# Patient Record
Sex: MALE | Race: WHITE | NOT HISPANIC OR LATINO | Employment: UNEMPLOYED | URBAN - METROPOLITAN AREA
[De-identification: names, ages, dates, MRNs, and addresses within clinical notes are randomized per-mention and may not be internally consistent; named-entity substitution may affect disease eponyms.]

---

## 2019-04-28 PROBLEM — K56.609 SBO (SMALL BOWEL OBSTRUCTION): Status: ACTIVE | Noted: 2019-04-28

## 2019-05-29 ENCOUNTER — HOSPITAL ENCOUNTER (INPATIENT)
Facility: HOSPITAL | Age: 44
LOS: 6 days | Discharge: HOME | DRG: 882 | End: 2019-06-04
Attending: PSYCHIATRY & NEUROLOGY | Admitting: PSYCHIATRY & NEUROLOGY
Payer: MEDICAID

## 2019-05-29 DIAGNOSIS — F15.20 METHAMPHETAMINE USE DISORDER, SEVERE: ICD-10-CM

## 2019-05-29 DIAGNOSIS — M25.571 ACUTE RIGHT ANKLE PAIN: ICD-10-CM

## 2019-05-29 DIAGNOSIS — R45.851 DEPRESSION WITH SUICIDAL IDEATION: ICD-10-CM

## 2019-05-29 DIAGNOSIS — F33.2 SEVERE EPISODE OF RECURRENT MAJOR DEPRESSIVE DISORDER, WITHOUT PSYCHOTIC FEATURES: Primary | ICD-10-CM

## 2019-05-29 DIAGNOSIS — F32.A DEPRESSION WITH SUICIDAL IDEATION: ICD-10-CM

## 2019-05-29 DIAGNOSIS — K50.10 CROHN'S DISEASE OF COLON WITHOUT COMPLICATION: ICD-10-CM

## 2019-05-29 DIAGNOSIS — F43.23 ADJUSTMENT DISORDER WITH MIXED ANXIETY AND DEPRESSED MOOD: ICD-10-CM

## 2019-05-29 PROCEDURE — 80061 LIPID PANEL: CPT

## 2019-05-29 PROCEDURE — 83036 HEMOGLOBIN GLYCOSYLATED A1C: CPT

## 2019-05-29 PROCEDURE — 82306 VITAMIN D 25 HYDROXY: CPT

## 2019-05-29 PROCEDURE — 86701 HIV-1ANTIBODY: CPT

## 2019-05-29 PROCEDURE — 82746 ASSAY OF FOLIC ACID SERUM: CPT

## 2019-05-29 PROCEDURE — 82607 VITAMIN B-12: CPT

## 2019-05-29 PROCEDURE — 80074 ACUTE HEPATITIS PANEL: CPT

## 2019-05-29 PROCEDURE — 86592 SYPHILIS TEST NON-TREP QUAL: CPT

## 2019-05-29 PROCEDURE — 11400000 HC PSYCH PRIVATE ROOM

## 2019-05-29 RX ORDER — OLANZAPINE 10 MG/1
10 TABLET ORAL EVERY 4 HOURS PRN
Status: DISCONTINUED | OUTPATIENT
Start: 2019-05-29 | End: 2019-06-04 | Stop reason: HOSPADM

## 2019-05-29 RX ORDER — FOLIC ACID 1 MG/1
1 TABLET ORAL DAILY
Status: DISCONTINUED | OUTPATIENT
Start: 2019-05-30 | End: 2019-06-04 | Stop reason: HOSPADM

## 2019-05-29 RX ORDER — LOPERAMIDE HYDROCHLORIDE 2 MG/1
2 CAPSULE ORAL
Status: DISCONTINUED | OUTPATIENT
Start: 2019-05-29 | End: 2019-06-04 | Stop reason: HOSPADM

## 2019-05-29 RX ORDER — ACETAMINOPHEN 325 MG/1
650 TABLET ORAL EVERY 6 HOURS PRN
Status: DISCONTINUED | OUTPATIENT
Start: 2019-05-29 | End: 2019-06-04 | Stop reason: HOSPADM

## 2019-05-29 RX ORDER — HYDROXYZINE PAMOATE 50 MG/1
50 CAPSULE ORAL NIGHTLY PRN
Status: DISCONTINUED | OUTPATIENT
Start: 2019-05-29 | End: 2019-06-04 | Stop reason: HOSPADM

## 2019-05-29 RX ORDER — IBUPROFEN 200 MG
1 TABLET ORAL DAILY PRN
Status: DISCONTINUED | OUTPATIENT
Start: 2019-05-29 | End: 2019-05-30

## 2019-05-29 RX ORDER — DOCUSATE SODIUM 100 MG/1
100 CAPSULE, LIQUID FILLED ORAL DAILY PRN
Status: DISCONTINUED | OUTPATIENT
Start: 2019-05-29 | End: 2019-06-04 | Stop reason: HOSPADM

## 2019-05-29 RX ORDER — OLANZAPINE 10 MG/2ML
10 INJECTION, POWDER, FOR SOLUTION INTRAMUSCULAR EVERY 4 HOURS PRN
Status: DISCONTINUED | OUTPATIENT
Start: 2019-05-29 | End: 2019-06-04 | Stop reason: HOSPADM

## 2019-05-29 RX ORDER — MAG HYDROX/ALUMINUM HYD/SIMETH 200-200-20
30 SUSPENSION, ORAL (FINAL DOSE FORM) ORAL EVERY 6 HOURS PRN
Status: DISCONTINUED | OUTPATIENT
Start: 2019-05-29 | End: 2019-06-04 | Stop reason: HOSPADM

## 2019-05-30 PROBLEM — F43.23 ADJUSTMENT DISORDER WITH MIXED ANXIETY AND DEPRESSED MOOD: Status: ACTIVE | Noted: 2019-05-30

## 2019-05-30 PROBLEM — K50.10 CROHN'S DISEASE OF COLON WITHOUT COMPLICATION: Status: ACTIVE | Noted: 2019-05-30

## 2019-05-30 PROBLEM — M25.571 ACUTE RIGHT ANKLE PAIN: Status: ACTIVE | Noted: 2019-05-30

## 2019-05-30 PROBLEM — F15.20 METHAMPHETAMINE USE DISORDER, SEVERE: Status: ACTIVE | Noted: 2019-05-30

## 2019-05-30 LAB
25(OH)D3+25(OH)D2 SERPL-MCNC: 23 NG/ML (ref 30–96)
CHOLEST SERPL-MCNC: 145 MG/DL (ref 120–199)
CHOLEST/HDLC SERPL: 3.6 {RATIO} (ref 2–5)
ESTIMATED AVG GLUCOSE: 100 MG/DL (ref 68–131)
FOLATE SERPL-MCNC: 11.9 NG/ML (ref 4–24)
HAV IGM SERPL QL IA: NEGATIVE
HBA1C MFR BLD HPLC: 5.1 % (ref 4–5.6)
HBV CORE IGM SERPL QL IA: NEGATIVE
HBV SURFACE AG SERPL QL IA: NEGATIVE
HCV AB SERPL QL IA: NEGATIVE
HDLC SERPL-MCNC: 40 MG/DL (ref 40–75)
HDLC SERPL: 27.6 % (ref 20–50)
HIV1+2 IGG SERPL QL IA.RAPID: NEGATIVE
LDLC SERPL CALC-MCNC: 79.8 MG/DL (ref 63–159)
NONHDLC SERPL-MCNC: 105 MG/DL
TRIGL SERPL-MCNC: 126 MG/DL (ref 30–150)
VIT B12 SERPL-MCNC: <146 PG/ML (ref 210–950)

## 2019-05-30 PROCEDURE — 99223 PR INITIAL HOSPITAL CARE,LEVL III: ICD-10-PCS | Mod: ,,, | Performed by: PSYCHIATRY & NEUROLOGY

## 2019-05-30 PROCEDURE — 99221 PR INITIAL HOSPITAL CARE,LEVL I: ICD-10-PCS | Mod: ,,, | Performed by: NURSE PRACTITIONER

## 2019-05-30 PROCEDURE — 99223 1ST HOSP IP/OBS HIGH 75: CPT | Mod: ,,, | Performed by: PSYCHIATRY & NEUROLOGY

## 2019-05-30 PROCEDURE — 36415 COLL VENOUS BLD VENIPUNCTURE: CPT

## 2019-05-30 PROCEDURE — 11400000 HC PSYCH PRIVATE ROOM

## 2019-05-30 PROCEDURE — 90833 PR PSYCHOTHERAPY W/PATIENT W/E&M, 30 MIN (ADD ON): ICD-10-PCS | Mod: ,,, | Performed by: PSYCHIATRY & NEUROLOGY

## 2019-05-30 PROCEDURE — S4991 NICOTINE PATCH NONLEGEND: HCPCS | Performed by: PSYCHIATRY & NEUROLOGY

## 2019-05-30 PROCEDURE — 25000003 PHARM REV CODE 250: Performed by: PSYCHIATRY & NEUROLOGY

## 2019-05-30 PROCEDURE — 97802 MEDICAL NUTRITION INDIV IN: CPT

## 2019-05-30 PROCEDURE — 99221 1ST HOSP IP/OBS SF/LOW 40: CPT | Mod: ,,, | Performed by: NURSE PRACTITIONER

## 2019-05-30 PROCEDURE — 90833 PSYTX W PT W E/M 30 MIN: CPT | Mod: ,,, | Performed by: PSYCHIATRY & NEUROLOGY

## 2019-05-30 RX ORDER — IBUPROFEN 200 MG
1 TABLET ORAL DAILY
Status: DISCONTINUED | OUTPATIENT
Start: 2019-05-30 | End: 2019-06-03

## 2019-05-30 RX ORDER — BUPROPION HYDROCHLORIDE 150 MG/1
150 TABLET ORAL DAILY
Status: DISCONTINUED | OUTPATIENT
Start: 2019-05-30 | End: 2019-06-02

## 2019-05-30 RX ADMIN — BUPROPION HYDROCHLORIDE 150 MG: 150 TABLET, EXTENDED RELEASE ORAL at 09:05

## 2019-05-30 RX ADMIN — THERA TABS 1 TABLET: TAB at 08:05

## 2019-05-30 RX ADMIN — FOLIC ACID 1 MG: 1 TABLET ORAL at 08:05

## 2019-05-30 RX ADMIN — NICOTINE 1 PATCH: 14 PATCH, EXTENDED RELEASE TRANSDERMAL at 09:05

## 2019-05-30 NOTE — PSYCH
"Group Note      Behavior    Patient attended group psychotherapy today. Patient exhibited very  depressive mood with guarded affect.     Intervention     CBT-based group psychotherapy with focused attention on the identification of internal coping strategies including the importance of re-framing identified anxieties to formulate positive re-frames as well as to identifying and exploring one thing that is most important to patients and worth living for; patient were asked to discuss their internal coping strategies.       Response    Patient stated initially that "society shit on me and I am not happy about it." Patient spoke about the source of his homelessness and his inability to find lasting employment. Patient participated in an initial role playing exercise. He agreed after the exercise that having a better attitude may have its advantages; it may open a new place in his heart, a place where he may be able to welcome new friends and open fully celebrate his humanity.         Plan    To continue to encourage the patient to attend group psychotherapy with focused attention on continued work in the area of internal coping strategies.                       "

## 2019-05-30 NOTE — PROGRESS NOTES
"   05/30/19 1040   [facility] Group Therapy   Group Name Leisure Skills Training   Specific Interventions Cognitive Stimulation Training   Participation Level Appropriate;Attentive   Participation Quality Cooperative   Insight/Motivation Applies New Skills;Good   Affect/Mood Display Depressed   Cognition Alert   Psychomotor WNL   Patient shows interest in activity, quiet unless approached, shows initial ability to comprehend directions. Patient states skills he used most for this activity was "concentration and observation."   "

## 2019-05-30 NOTE — PSYCH
"Patient remarked that he needs immediate shelter for himself and his wife and her daughter. Patient stated that any other conversation not "geared" towards this goal is not worth his time.     Plan    Patient engagement will continue to be initiated at another time; was not interested in continuing assessment until the focus is only on addressing his need for housing.   "
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
## 2019-05-30 NOTE — CONSULTS
[facility] Hospital Medicine  Consult Note    Patient Name: Chon Bess  MRN: [id number]  Admission Date: 5/29/2019  Hospital Length of Stay: 1 days  Attending Physician: César Scott MD   Primary Care Provider: Primary Doctor No           Patient information was obtained from patient and ER records.     Inpatient consult to [facility] for History and Physical  Consult performed by: Jeniffer Pantoja NP  Consult ordered by: César Scott MD        Subjective:     Principal Problem: <principal problem not specified>    Chief Complaint: No chief complaint on file.       HPI: 45yo male patient admitted via ER to [facility] with c/o suicidal thoughts. He has no medical history and does not take meds at home. Medicine consulted for H/P. VSS/afebrile. Labs reviewed + UDS    He reports that he has crohns with intermittent N/D but none today. Does not see anyone for this now. Also c/o ankle pain. Has hx of fracture and had screws placed,. Chronic pain and sees no one for this either. Has tylenol PRN     Past Medical History:   Diagnosis Date    Crohn's disease     Depression     Substance abuse        Past Surgical History:   Procedure Laterality Date    COLON SURGERY         Review of patient's allergies indicates:  No Known Allergies    Current Facility-Administered Medications on File Prior to Encounter   Medication    [DISCONTINUED] diphenhydrAMINE injection 50 mg    [DISCONTINUED] haloperidol lactate injection 5 mg    [DISCONTINUED] LORazepam tablet 1 mg     No current outpatient medications on file prior to encounter.     Family History     None        Tobacco Use    Smoking status: Current Every Day Smoker     Packs/day: 1.50     Types: Cigarettes    Smokeless tobacco: Never Used   Substance and Sexual Activity    Alcohol use: Never     Frequency: Never     Comment: occ    Drug use: Yes     Types: Amphetamines    Sexual activity: Yes     Partners: Female     Birth  control/protection: Coitus interruptus     Review of Systems   Constitutional: Negative for chills and fever.   HENT: Negative for congestion, postnasal drip and sore throat.    Eyes: Negative for photophobia.   Respiratory: Negative for chest tightness and shortness of breath.    Cardiovascular: Negative for chest pain.   Gastrointestinal: Negative for abdominal distention, abdominal pain, blood in stool and vomiting.   Genitourinary: Negative for dysuria, flank pain and hematuria.   Musculoskeletal: Negative for back pain.   Skin: Negative for pallor.   Neurological: Negative for dizziness, seizures, facial asymmetry, speech difficulty and numbness.   Hematological: Does not bruise/bleed easily.   Psychiatric/Behavioral: Negative for agitation and suicidal ideas. The patient is not nervous/anxious.      Objective:     Vital Signs (Most Recent):  Temp: 96.5 °F (35.8 °C) (05/30/19 0800)  Pulse: 67 (05/30/19 0800)  Resp: 18 (05/30/19 0800)  BP: 123/83 (05/30/19 0800) Vital Signs (24h Range):  Temp:  [96.5 °F (35.8 °C)-98.1 °F (36.7 °C)] 96.5 °F (35.8 °C)  Pulse:  [] 67  Resp:  [16-18] 18  SpO2:  [97 %] 97 %  BP: (117-130)/(68-97) 123/83     Weight: 72.7 kg (160 lb 4.4 oz)  Body mass index is 22.35 kg/m².    Physical Exam   Constitutional: He is oriented to person, place, and time. He appears well-developed and well-nourished.   HENT:   Head: Normocephalic and atraumatic.   Neck: Normal range of motion. Neck supple. No thyromegaly present.   Cardiovascular: Normal rate, regular rhythm, normal heart sounds and intact distal pulses.   No murmur heard.  Pulmonary/Chest: Effort normal and breath sounds normal. No respiratory distress. He has no wheezes. He has no rales.   Abdominal: Soft. Bowel sounds are normal. He exhibits no distension. There is no tenderness.   Musculoskeletal: Normal range of motion. He exhibits no edema or deformity.   Neurological: He is alert and oriented to person, place, and time.   Neuro:  Cranial nerves:  CN II Visual fields full to confrontation.   CN III, IV, VI Pupils are equal, round, and reactive to light.  CN III: no palsy  Nystagmus: none   Diplopia: none  Ophthalmoparesis: none  CN V Facial sensation intact.   CN VII Facial expression full, symmetric.   CN VIII normal.   CN IX normal.   CN X normal.   CN XI normal.   CN XII normal.     Skin: Skin is warm and dry.   Psychiatric: He has a normal mood and affect. His behavior is normal. Thought content normal.   Nursing note and vitals reviewed.      Significant Labs:   U/A  Results for orders placed or performed during the hospital encounter of 09/17/18   Urinalysis Clean Catch   Result Value Ref Range    Specimen UA Urine, Clean Catch     Color, UA Yellow Yellow, Straw, Ghislaine    Appearance, UA Clear Clear    pH, UA 6.0 5.0 - 8.0    Specific Gravity, UA 1.015 1.005 - 1.030    Protein, UA Negative Negative    Glucose, UA Negative Negative    Ketones, UA Negative Negative    Bilirubin (UA) Negative Negative    Occult Blood UA Negative Negative    Nitrite, UA Negative Negative    Urobilinogen, UA Negative <2.0 EU/dL    Leukocytes, UA Negative Negative     UDS  Results for orders placed or performed during the hospital encounter of 05/29/19   Drug screen panel, emergency   Result Value Ref Range    Benzodiazepines Negative     Methadone metabolites Negative     Cocaine (Metab.) Negative     Opiate Scrn, Ur Negative     Barbiturate Screen, Ur Negative     Amphetamine Screen, Ur Presumptive Positive     THC Negative     Phencyclidine Negative     Creatinine, Random Ur 144.7 23.0 - 375.0 mg/dL    Toxicology Information SEE COMMENT      CBC  Results for orders placed or performed during the hospital encounter of 05/29/19   CBC auto differential   Result Value Ref Range    WBC 10.10 3.90 - 12.70 K/uL    RBC 4.48 (L) 4.60 - 6.20 M/uL    Hemoglobin 14.2 14.0 - 18.0 g/dL    Hematocrit 42.6 40.0 - 54.0 %    Mean Corpuscular Volume 95 82 - 98 fL    Mean  Corpuscular Hemoglobin 31.7 (H) 27.0 - 31.0 pg    Mean Corpuscular Hemoglobin Conc 33.3 32.0 - 36.0 g/dL    RDW 12.4 11.5 - 14.5 %    Platelets 251 150 - 350 K/uL    MPV 9.2 9.2 - 12.9 fL    Gran # (ANC) 7.0 1.8 - 7.7 K/uL    Lymph # 2.2 1.0 - 4.8 K/uL    Mono # 0.6 0.3 - 1.0 K/uL    Eos # 0.2 0.0 - 0.5 K/uL    Baso # 0.04 0.00 - 0.20 K/uL    Gran% 69.5 38.0 - 73.0 %    Lymph% 21.9 18.0 - 48.0 %    Mono% 6.3 4.0 - 15.0 %    Eosinophil% 1.9 0.0 - 8.0 %    Basophil% 0.4 0.0 - 1.9 %    Differential Method Automated      CMP  Results for orders placed or performed during the hospital encounter of 05/29/19   Comprehensive metabolic panel   Result Value Ref Range    Sodium 138 136 - 145 mmol/L    Potassium 3.6 3.5 - 5.1 mmol/L    Chloride 107 95 - 110 mmol/L    CO2 25 23 - 29 mmol/L    Glucose 94 70 - 110 mg/dL    BUN, Bld 6 6 - 20 mg/dL    Creatinine 0.8 0.5 - 1.4 mg/dL    Calcium 8.6 (L) 8.7 - 10.5 mg/dL    Total Protein 6.0 6.0 - 8.4 g/dL    Albumin 3.3 (L) 3.5 - 5.2 g/dL    Total Bilirubin 0.2 0.1 - 1.0 mg/dL    Alkaline Phosphatase 57 55 - 135 U/L    AST 12 10 - 40 U/L    ALT 11 10 - 44 U/L    Anion Gap 6 (L) 8 - 16 mmol/L    eGFR if African American >60 >60 mL/min/1.73 m^2    eGFR if non African American >60 >60 mL/min/1.73 m^2     TSH  Results for orders placed or performed during the hospital encounter of 05/29/19   TSH   Result Value Ref Range    TSH 0.777 0.400 - 4.000 uIU/mL     ETOH  Results for orders placed or performed during the hospital encounter of 05/29/19   Ethanol   Result Value Ref Range    Alcohol, Medical, Serum <10 <10 mg/dL     Salicylate  Results for orders placed or performed during the hospital encounter of 05/29/19   Salicylate level   Result Value Ref Range    Salicylate Lvl <5.0 (L) 15.0 - 30.0 mg/dL     Acetaminophen  Results for orders placed or performed during the hospital encounter of 05/29/19   Acetaminophen level   Result Value Ref Range    Acetaminophen (Tylenol), Serum <3.0 (L)  10.0 - 20.0 ug/mL             Assessment/Plan:     Crohn's disease of colon without complication  No acute issue currently  Needs Gi f/u outpt      Acute right ankle pain  Tylenol as needed  Can f/u with ortho outpt      Adjustment disorder with mixed anxiety and depressed mood  Further orders per psych      Methamphetamine use disorder, severe  noted      Depression with suicidal ideation  Further orders per psych        VTE Risk Mitigation (From admission, onward)    None              Thank you for your consult. I will sign off. Please contact us if you have any additional questions.    Jeniffer Pantoja NP  Department of Hospital Medicine   [facility]

## 2019-05-30 NOTE — PSYCH
"Patient was satisfied when given a list of vendors to contact that may be able to offer some assistance to him and his wife and step daughter. Patient's anger was significantly decreased as a result and he stated he will have "a good night".       Plan    To continue helping patient with a discharge plan.   "

## 2019-05-30 NOTE — PLAN OF CARE
Problem: Adult Behavioral Health Plan of Care  Goal: Plan of Care Review  Recommendation: Continue regular diet     Goals: Meet >85% EEN and EPN daily

## 2019-05-30 NOTE — HPI
45yo male patient admitted via ER to [facility] with c/o suicidal thoughts. He has no medical history and does not take meds at home. Medicine consulted for H/P. VSS/afebrile. Labs reviewed + UDS    He reports that he has crohns with intermittent N/D but none today. Does not see anyone for this now. Also c/o ankle pain. Has hx of fracture and had screws placed,. Chronic pain and sees no one for this either. Has tylenol PRN

## 2019-05-30 NOTE — SUBJECTIVE & OBJECTIVE
Past Medical History:   Diagnosis Date    Crohn's disease     Depression     Substance abuse        Past Surgical History:   Procedure Laterality Date    COLON SURGERY         Review of patient's allergies indicates:  No Known Allergies    Current Facility-Administered Medications on File Prior to Encounter   Medication    [DISCONTINUED] diphenhydrAMINE injection 50 mg    [DISCONTINUED] haloperidol lactate injection 5 mg    [DISCONTINUED] LORazepam tablet 1 mg     No current outpatient medications on file prior to encounter.     Family History     None        Tobacco Use    Smoking status: Current Every Day Smoker     Packs/day: 1.50     Types: Cigarettes    Smokeless tobacco: Never Used   Substance and Sexual Activity    Alcohol use: Never     Frequency: Never     Comment: occ    Drug use: Yes     Types: Amphetamines    Sexual activity: Yes     Partners: Female     Birth control/protection: Coitus interruptus     Review of Systems   Constitutional: Negative for chills and fever.   HENT: Negative for congestion, postnasal drip and sore throat.    Eyes: Negative for photophobia.   Respiratory: Negative for chest tightness and shortness of breath.    Cardiovascular: Negative for chest pain.   Gastrointestinal: Negative for abdominal distention, abdominal pain, blood in stool and vomiting.   Genitourinary: Negative for dysuria, flank pain and hematuria.   Musculoskeletal: Negative for back pain.   Skin: Negative for pallor.   Neurological: Negative for dizziness, seizures, facial asymmetry, speech difficulty and numbness.   Hematological: Does not bruise/bleed easily.   Psychiatric/Behavioral: Negative for agitation and suicidal ideas. The patient is not nervous/anxious.      Objective:     Vital Signs (Most Recent):  Temp: 96.5 °F (35.8 °C) (05/30/19 0800)  Pulse: 67 (05/30/19 0800)  Resp: 18 (05/30/19 0800)  BP: 123/83 (05/30/19 0800) Vital Signs (24h Range):  Temp:  [96.5 °F (35.8 °C)-98.1 °F (36.7 °C)]  96.5 °F (35.8 °C)  Pulse:  [] 67  Resp:  [16-18] 18  SpO2:  [97 %] 97 %  BP: (117-130)/(68-97) 123/83     Weight: 72.7 kg (160 lb 4.4 oz)  Body mass index is 22.35 kg/m².    Physical Exam   Constitutional: He is oriented to person, place, and time. He appears well-developed and well-nourished.   HENT:   Head: Normocephalic and atraumatic.   Neck: Normal range of motion. Neck supple. No thyromegaly present.   Cardiovascular: Normal rate, regular rhythm, normal heart sounds and intact distal pulses.   No murmur heard.  Pulmonary/Chest: Effort normal and breath sounds normal. No respiratory distress. He has no wheezes. He has no rales.   Abdominal: Soft. Bowel sounds are normal. He exhibits no distension. There is no tenderness.   Musculoskeletal: Normal range of motion. He exhibits no edema or deformity.   Neurological: He is alert and oriented to person, place, and time.   Neuro: Cranial nerves:  CN II Visual fields full to confrontation.   CN III, IV, VI Pupils are equal, round, and reactive to light.  CN III: no palsy  Nystagmus: none   Diplopia: none  Ophthalmoparesis: none  CN V Facial sensation intact.   CN VII Facial expression full, symmetric.   CN VIII normal.   CN IX normal.   CN X normal.   CN XI normal.   CN XII normal.     Skin: Skin is warm and dry.   Psychiatric: He has a normal mood and affect. His behavior is normal. Thought content normal.   Nursing note and vitals reviewed.      Significant Labs:   U/A  Results for orders placed or performed during the hospital encounter of 09/17/18   Urinalysis Clean Catch   Result Value Ref Range    Specimen UA Urine, Clean Catch     Color, UA Yellow Yellow, Straw, Ghislaine    Appearance, UA Clear Clear    pH, UA 6.0 5.0 - 8.0    Specific Gravity, UA 1.015 1.005 - 1.030    Protein, UA Negative Negative    Glucose, UA Negative Negative    Ketones, UA Negative Negative    Bilirubin (UA) Negative Negative    Occult Blood UA Negative Negative    Nitrite, UA  Negative Negative    Urobilinogen, UA Negative <2.0 EU/dL    Leukocytes, UA Negative Negative     UDS  Results for orders placed or performed during the hospital encounter of 05/29/19   Drug screen panel, emergency   Result Value Ref Range    Benzodiazepines Negative     Methadone metabolites Negative     Cocaine (Metab.) Negative     Opiate Scrn, Ur Negative     Barbiturate Screen, Ur Negative     Amphetamine Screen, Ur Presumptive Positive     THC Negative     Phencyclidine Negative     Creatinine, Random Ur 144.7 23.0 - 375.0 mg/dL    Toxicology Information SEE COMMENT      CBC  Results for orders placed or performed during the hospital encounter of 05/29/19   CBC auto differential   Result Value Ref Range    WBC 10.10 3.90 - 12.70 K/uL    RBC 4.48 (L) 4.60 - 6.20 M/uL    Hemoglobin 14.2 14.0 - 18.0 g/dL    Hematocrit 42.6 40.0 - 54.0 %    Mean Corpuscular Volume 95 82 - 98 fL    Mean Corpuscular Hemoglobin 31.7 (H) 27.0 - 31.0 pg    Mean Corpuscular Hemoglobin Conc 33.3 32.0 - 36.0 g/dL    RDW 12.4 11.5 - 14.5 %    Platelets 251 150 - 350 K/uL    MPV 9.2 9.2 - 12.9 fL    Gran # (ANC) 7.0 1.8 - 7.7 K/uL    Lymph # 2.2 1.0 - 4.8 K/uL    Mono # 0.6 0.3 - 1.0 K/uL    Eos # 0.2 0.0 - 0.5 K/uL    Baso # 0.04 0.00 - 0.20 K/uL    Gran% 69.5 38.0 - 73.0 %    Lymph% 21.9 18.0 - 48.0 %    Mono% 6.3 4.0 - 15.0 %    Eosinophil% 1.9 0.0 - 8.0 %    Basophil% 0.4 0.0 - 1.9 %    Differential Method Automated      CMP  Results for orders placed or performed during the hospital encounter of 05/29/19   Comprehensive metabolic panel   Result Value Ref Range    Sodium 138 136 - 145 mmol/L    Potassium 3.6 3.5 - 5.1 mmol/L    Chloride 107 95 - 110 mmol/L    CO2 25 23 - 29 mmol/L    Glucose 94 70 - 110 mg/dL    BUN, Bld 6 6 - 20 mg/dL    Creatinine 0.8 0.5 - 1.4 mg/dL    Calcium 8.6 (L) 8.7 - 10.5 mg/dL    Total Protein 6.0 6.0 - 8.4 g/dL    Albumin 3.3 (L) 3.5 - 5.2 g/dL    Total Bilirubin 0.2 0.1 - 1.0 mg/dL    Alkaline Phosphatase  57 55 - 135 U/L    AST 12 10 - 40 U/L    ALT 11 10 - 44 U/L    Anion Gap 6 (L) 8 - 16 mmol/L    eGFR if African American >60 >60 mL/min/1.73 m^2    eGFR if non African American >60 >60 mL/min/1.73 m^2     TSH  Results for orders placed or performed during the hospital encounter of 05/29/19   TSH   Result Value Ref Range    TSH 0.777 0.400 - 4.000 uIU/mL     ETOH  Results for orders placed or performed during the hospital encounter of 05/29/19   Ethanol   Result Value Ref Range    Alcohol, Medical, Serum <10 <10 mg/dL     Salicylate  Results for orders placed or performed during the hospital encounter of 05/29/19   Salicylate level   Result Value Ref Range    Salicylate Lvl <5.0 (L) 15.0 - 30.0 mg/dL     Acetaminophen  Results for orders placed or performed during the hospital encounter of 05/29/19   Acetaminophen level   Result Value Ref Range    Acetaminophen (Tylenol), Serum <3.0 (L) 10.0 - 20.0 ug/mL

## 2019-05-30 NOTE — PLAN OF CARE
"Problem: Adult Behavioral Health Plan of Care  Goal: Plan of Care Review  Outcome: Ongoing (interventions implemented as appropriate)  Pt is cooperative.  Depressed and somewhat irritable at times but compliant.  Reports passive S/i with no plan, "just thoughts" with no intent.  Pt upset over current living situation.  Pt compliant with meds this morning.  Instructed to report any issues to staff, understanding verbalized.  No acute distress apparent at this time, will continue to monitor.      "

## 2019-05-30 NOTE — CONSULTS
"[facility]  Adult Nutrition  Consult Note    SUMMARY       Recommendations    Recommendation: Continue regular diet    Goals: Meet >85% EEN and EPN daily    Nutrition Goal Status: new  Communication of RD Recs: other (comment)(POC)    Reason for Assessment    Reason For Assessment: consult  Diagnosis: psychological disorder  Relevant Medical History: Substance abuse, Depression, Crohn's disease  General Information Comments: Remote assessment completed. Pt noted with good appetite since admission, finishing 100% of meals. Per chart, pt has had an 11% wt loss x 8.5 months. No N/V/D/C noted.   Nutrition Discharge Planning: d/c on regular diet    Nutrition Risk Screen    Nutrition Risk Screen: no indicators present    Nutrition/Diet History    Spiritual, Cultural Beliefs, Advent Practices, Values that Affect Care: no    Anthropometrics    Temp: 97.2 °F (36.2 °C)  Height Method: Stated  Height: 5' 11" (180.3 cm)  Height (inches): 71 in  Weight Method: Standard Scale  Weight: 72.7 kg (160 lb 4.4 oz)  Weight (lb): 160.28 lb  Ideal Body Weight (IBW), Male: 172 lb  % Ideal Body Weight, Male (lb): 93.19 lb  BMI (Calculated): 22.4  BMI Grade: 18.5-24.9 - normal    Lab/Procedures/Meds    Pertinent Labs: Reviewed  Lab Results   Component Value Date    CALCIUM 8.6 (L) 05/29/2019    ALBUMIN 3.3 (L) 05/29/2019     Pertinent Meds: Reviewed  Scheduled Meds:   buPROPion  150 mg Oral Daily    folic acid  1 mg Oral Daily    multivitamin  1 tablet Oral Daily    nicotine  1 patch Transdermal Daily     Estimated/Assessed Needs    Weight Used For Calorie Calculations: 72.7 kg (160 lb 4.4 oz)  Energy Calorie Requirements (kcal): 0275-1980  Energy Need Method: Kcal/kg(25-30 kcal/kg)  Protein Requirements: 73-87 gm/d (1-1.2 gm/kg)  Weight Used For Protein Calculations: 72.7 kg (160 lb 4.4 oz)  Fluid Requirements (mL): 1820(or per team)  Estimated Fluid Requirement Method: RDA Method    Nutrition Prescription " Ordered    Current Diet Order: Regular    Evaluation of Received Nutrient/Fluid Intake    % Intake of Estimated Energy Needs: 75 - 100 %  % Meal Intake: 75 - 100 %    Nutrition Risk    Level of Risk/Frequency of Follow-up: low     Assessment and Plan    No nutrition diagnosis at this time    Monitor and Evaluation    Food and Nutrient Intake: energy intake, food and beverage intake  Food and Nutrient Adminstration: diet order  Anthropometric Measurements: weight, weight change  Biochemical Data, Medical Tests and Procedures: electrolyte and renal panel, gastrointestinal profile, glucose/endocrine profile, inflammatory profile, lipid profile  Nutrition-Focused Physical Findings: overall appearance, extremities, muscles and bones, skin     Malnutrition Assessment     Teeth (Micronutrient): carries(teeth missing)     Nutrition Follow-Up    RD Follow-up?: Yes
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
## 2019-05-31 LAB — RPR SER QL: NORMAL

## 2019-05-31 PROCEDURE — S4991 NICOTINE PATCH NONLEGEND: HCPCS | Performed by: PSYCHIATRY & NEUROLOGY

## 2019-05-31 PROCEDURE — 25000003 PHARM REV CODE 250: Performed by: PSYCHIATRY & NEUROLOGY

## 2019-05-31 PROCEDURE — 99233 PR SUBSEQUENT HOSPITAL CARE,LEVL III: ICD-10-PCS | Mod: ,,, | Performed by: PSYCHIATRY & NEUROLOGY

## 2019-05-31 PROCEDURE — 99233 SBSQ HOSP IP/OBS HIGH 50: CPT | Mod: ,,, | Performed by: PSYCHIATRY & NEUROLOGY

## 2019-05-31 PROCEDURE — 11400000 HC PSYCH PRIVATE ROOM

## 2019-05-31 RX ADMIN — NICOTINE 1 PATCH: 14 PATCH, EXTENDED RELEASE TRANSDERMAL at 09:05

## 2019-05-31 RX ADMIN — FOLIC ACID 1 MG: 1 TABLET ORAL at 08:05

## 2019-05-31 RX ADMIN — BUPROPION HYDROCHLORIDE 150 MG: 150 TABLET, EXTENDED RELEASE ORAL at 08:05

## 2019-05-31 RX ADMIN — THERA TABS 1 TABLET: TAB at 08:05
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
## 2019-05-31 NOTE — PROGRESS NOTES
"   05/31/19 1419   Assessment   Patient's Identification of the Problem Patient presents tearful, depressed, frustrated, angry and feeling "down" mood. Patient states his admit is due to "thoughts of killing myself,. I feel like a failure." Patient reports increase stress due to being homeless and not having a job. Patient reports he has been off his medications for about 8 months. Patient admits to negative leisure lifestyle of  cigarettes, marijuana(often), crystal meth(1-2 times weekly). 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 Patient verbalized main goal "I want to get my head right so I can work on getting my family a home. Drugs are not the issue."   Leisure Interest Watching TV;Shopping;Listen to Music;Sleep;Sit Outside;Cooking;Fishing;Video Games;Classical/Table Games;Sports;Other (See Comments)  (not activitely participating in activities)   Leisure Barriers Endurance;In Pain;Homeless;Lack of Transportation;Attitude;Loss of Interest;Lack of Finances;Energy Level;Self Confidence;Drug Use;Physical Limitation;Other (See Comments)  (right foot pain,I flash out when angry,Crohn's disease)   Treatment Focus To Improve Mood;To Increase Motivation;To Decrease Agitation and Increase Frustration Tolerance;Increase Self Confidence;To Improve Leisure Awareness/Lifestyle/Interest;To Promote Successful and Safe Self Expression;To Improve Coping Skills;To Increase Energy Level;To Educate and Increase Awareness of Sober Free Activities     Treatment Recommendation: To address problem(s) #  1:1 Intervention (as needed)    Cognitive Stimulation Skilled Activity  Creative Expression " Skilled Activity  Mild Exercises Skilled Activity  Stress Management Skilled Activity  Coping Skilled Activity  Leisure Education and Awareness Skilled Activity    Treatment Goal(s):  Long Term Goals Refer To Master Treatment Plan    Short Term Treatment Goal(s)  Patient Will:  Exhibit Improvement in Mood  Demonstrate Constructive Expression of Feelings and Behavior  Identify at Least 2 Coping Skills or Leisure Skills to Reduce Depression and Hopelessness Upon Request from Therapist  Identify (+) Leisure / Recreation Activities that Promote Wellness and Promote a Sober Lifestyle    Discharge Recommendations:  Encourage Patient to Actively Utilize Available Community Resources to Increase Leisure Involvement to Decrease Signs and Symptoms of Illness  Encourage Patient to Utilize Coping Skills on a Regular Basis to Reduce the Risk of Decompensating and Re-Hospitalizations  AA/NA Meetings  Follow Up with After Care Appointments  Continue with Current Leisure Activities

## 2019-05-31 NOTE — PROGRESS NOTES
Patient stated that it was a very impulsive statement made in anger and frustration. Says he could never kill himself.

## 2019-05-31 NOTE — PROGRESS NOTES
05/31/19 1530   [facility] Group Therapy   Group Name Leisure Education   Specific Interventions Coping Skills Training  (assessing your own self-esteem)   Participation Level Active;Sharing   Participation Quality Cooperative   Insight/Motivation Applies New Skills;Good   Affect/Mood Display Depressed   Cognition Alert   Psychomotor WNL   Patient shared he he is easily hurt by criticism, is overly aggressive, try to hide his feelings, try to blame others for his mistakes and find excuses for refusing to change. Patient identified things he could do to improve his self-esteem and was given a list of positive ways to improve self-esteem.

## 2019-05-31 NOTE — PLAN OF CARE
Problem: Depression  Goal: Improved Mood    Intervention: Monitor and Manage Depressive Symptoms  Plan of care reviewed.  Denies intent to harm self or others at this time but admits to still having suicidal thoughts.   Is isolative in room but did come out of room to accept snacks.  Gait steady, no falls.  Limited interactions with staff and peers. Promoted an individualized safety plan, reality-based interactions, effective coping strategies, and impulse control.  Will continue to monitor for safety.

## 2019-05-31 NOTE — PLAN OF CARE
Problem: Adult Behavioral Health Plan of Care  Goal: Plan of Care Review  Outcome: Ongoing (interventions implemented as appropriate)  Pt is calm, meeting with the SW at this time.  Denies any active plan of suicide at this time but does report some passive ideation with no real plan.  Pt also reports some depression with anxiety.  States he is depressed about not having any where to live at this time.  Also states this causes him extra worry and daily concern.  Pt does appear anxious.  Able to redirect easily.  No acute distress apparent at this time, will continue to monitor.

## 2019-05-31 NOTE — PSYCH
Patient asked to be excused from group psychotherapy today; stated he needed to sleep after his long conversation with many vendors today concerning where he may live once discharged from this facility.       Plan    To continue to assist the patient with discharge planning.

## 2019-05-31 NOTE — PROGRESS NOTES
PSYCHIATRY DAILY INPATIENT PROGRESS NOTE  SUBSEQUENT HOSPITAL VISIT    ENCOUNTER DATE: 5/31/2019  SITE: [facility]    DATE OF ADMISSION: 5/29/2019  6:40 PM  LENGTH OF STAY: 2 days      HISTORY    CHIEF COMPLAINT   Chon Bess is a 44 y.o. male, seen during daily snyder rounds on the inpatient unit.  Chon Bess presents with the chief complaint of suicidal ideation    HPI   (Elements: Location, Quality, Severity, Duration, Timing, Content, Modifying Factors, Associated Signs & Symptoms)    The patient was seen and examined. The chart was reviewed.     Staff reports no behavioral or management issues.     The patient has been compliant with treatment. The patient denied any side effects.    Patient endorses several active plans to hurt himself, none while on psych unit.  Patient endorses hopelessness related to not being able to work because of a foot injury.      Continued Symptoms of Depression: +diminished mood or loss of interest/anhedonia; +irritability, +diminished energy, +change in sleep, +change in appetite, +diminished concentration or cognition or indecisiveness, +PMA/R, +excessive guilt or hopelessness or worthlessness, +suicidal ideations     +Changes in Sleep: no trouble with initiation/maintenance, no early morning awakening with inability to return to sleep; +hypersomnolence     +Suicidal/Homicidal ideations: +active/passive ideations, no organized plans, no future intentions     Denied past or current Symptoms of psychosis: no hallucinations, delusions, disorganized thinking, disorganized behavior or abnormal motor behavior, or negative symptoms     Denied past or current Symptoms of salud or hypomania: no elevated, expansive, or irritable mood with no increased energy or activity; with no inflated self-esteem or grandiosity, decreased need for sleep, increased rate of speech, FOI or racing thoughts, distractibility, increased goal directed activity or PMA, or risky/disinhibited  "behavior     Some Symptoms of FRANCK: +excessive anxiety/worry/fear, +more days than not, +about numerous issues, +difficult to control, with periodic symptoms of restlessness, fatigue, poor concentration, irritability, muscle tension, and sleep disturbance; +causes functionally impairing distress; symptoms are adjustment related      +Substance Use: positive for intoxication, withdrawal, tolerance, used in larger amounts or duration than intended, unsuccessful attempts to limit or quit, increased time engaging in or seeking out, cravings or strong desire to use, failure to fulfill obligations, negative consequences in social/interpersonal/occupational,/recreational areas, use in dangerous situations, and medical or psychological consequences; pt denied having a problem, but described the above pathology as a consequence of his usage      ROS  Review of systems  Constitutional: No recent change in weight or fever  Musculoskeletal: +foot pain  Respiratory: No cough or shortness of breath  Cardiovascular: No chest pain, palpitations, or leg swelling  Gastrointestinal: No abdominal pain, nausea vomiting, or diarrhea  Genitourinary: No pain on urination  Neurologic: No dizziness, seizures, or headaches  Eyes: No change in vision  HENT: No congestion, hearing problem, tinnitus, dysphagia, or sore throat  Skin: No rash or wound    PAST MEDICAL HISTORY   Past Medical History:   Diagnosis Date    Addiction to drug 1999    Patient stated he first developed his addiction problem.     ADHD (attention deficit hyperactivity disorder) 1985    Patient was diagnosed with ADHD    Adjustment disorder 2000    Patient first got his divorce he stated.     Anxiety 2010    Patient stated he was first put "on xanax."     Crohn's disease     Depression 2015    Patient broke his foot and "could not do anything any more" he stated.     Sleep difficulties 2019    This year patient has been sleeping fourteen to sixteen hours a day.     " "Substance abuse            PSYCHOTROPIC MEDICATIONS   Scheduled Meds:   buPROPion  150 mg Oral Daily    folic acid  1 mg Oral Daily    multivitamin  1 tablet Oral Daily    nicotine  1 patch Transdermal Daily     Continuous Infusions:  PRN Meds:.acetaminophen, aluminum-magnesium hydroxide-simethicone, docusate sodium, hydrOXYzine pamoate, loperamide, OLANZapine **AND** OLANZapine        EXAMINATION    VITALS   Vitals:    05/31/19 0808   BP: 112/66   Pulse: 78   Resp: 16   Temp: 97.3 °F (36.3 °C)       CONSTITUTIONAL  General Appearance: WM, in hospital garb, normal weight; NAD     MUSCULOSKELETAL  Muscle Strength and Tone:  normal  Abnormal Involuntary Movements:  none  Gait and Station:  normal; non-ataxic     PSYCHIATRIC   Level of Consciousness: awake, alert  Orientation: p/p/t/s  Grooming:  adequate to circumstances  Psychomotor Behavior: no PMA/R  Speech: nl r/t/v/s  Language:  English fluent  Mood: "bad"  Affect: irritable, decreased range, anxious, dysthymic  Thought Process:  linear and organized  Associations:  intact; no WILMER  Thought Content:  +SI denied AVH/delusions; denied HI  Memory:  intact to recent and remote events  Attention:  intact to conversation; not distractible   Fund of Knowledge:  age and education appropriate  Estimate if Intelligence:  average based on work/education history, vocabulary and mental status exam  Insight:  good- seeks help, understands/accepts illness  Judgment:   good- no bx issues, compliant and cooperative      DIAGNOSTIC TESTING   Laboratory Results  No results found for this or any previous visit (from the past 24 hour(s)).      MEDICAL DECISION MAKING    DIAGNOSES  Unspecified Depressive Disorder  Adjustment Disorder with anxious features     Nicotine Dependence  Amphetamine Use disorder, severe, continuous with physiological dependence      Amphetamine Withdrawal     Psychosocial stressors     Chron's disease  Chronic foot pain    PROBLEM LIST AND MANAGEMENT " PLANS        PRESCRIPTION DRUG MANAGEMENT  Compliance: yes  Side Effects: no  Regimen Adjustments:   Depression/Anxiety: Start trial of Wellbutrin  mg po q day     Nicotine use: pt counseled; Wellbutrin as above; nicotine patch daily     Substance use: pt counseled; encouraging rehab vs outpatient tx     Psychosocial stressors: Pt counseled; SW consulted to assist with resources     Chron's disease: pt counseled; FM consult reviewed     Chronic foot pain: pt counseled; FM consult reviewed - possibly use home footwear      DISCHARGE PLANNING  -SW to assist with aftercare planning and collateral  -Once stable discharge home with outpatient follow up care and/or rehab  -Continue inpatient treatment under a PEC and/or CEC for danger to self,  danger to others, and grave disability as evident by significant mood symptoms with SI and severe psychosocial stressors and substance use problems.     NEED FOR CONTINUED HOSPITALIZATION  Psychiatric illness continues to pose a potential threat to life or bodily function, of self or others, thereby requiring the need for continued inpatient psychiatric hospitalization: Yes    Protective inpatient pyschiatric hospitalization required while a safe disposition plan is enacted: Yes    Patient stabilized and ready for discharge from inpatient psychiatric unit: No      STAFF:   Pierre Mejia MD  Psychiatry

## 2019-05-31 NOTE — PSYCH
Patient spoke with the [facility] staff members, [employer] Staff Members and Pastor Christofer Pereira. Patient stated that he was told that all three agencies have applications processes; says he does not have time for that and needs immediate help now.       Plan    To continue to help patient with discharge planning.

## 2019-06-01 PROCEDURE — 25000003 PHARM REV CODE 250: Performed by: PSYCHIATRY & NEUROLOGY

## 2019-06-01 PROCEDURE — 99233 PR SUBSEQUENT HOSPITAL CARE,LEVL III: ICD-10-PCS | Mod: ,,, | Performed by: PSYCHIATRY & NEUROLOGY

## 2019-06-01 PROCEDURE — 11400000 HC PSYCH PRIVATE ROOM

## 2019-06-01 PROCEDURE — S4991 NICOTINE PATCH NONLEGEND: HCPCS | Performed by: PSYCHIATRY & NEUROLOGY

## 2019-06-01 PROCEDURE — 99233 SBSQ HOSP IP/OBS HIGH 50: CPT | Mod: ,,, | Performed by: PSYCHIATRY & NEUROLOGY

## 2019-06-01 RX ADMIN — THERA TABS 1 TABLET: TAB at 08:06

## 2019-06-01 RX ADMIN — BUPROPION HYDROCHLORIDE 150 MG: 150 TABLET, EXTENDED RELEASE ORAL at 08:06

## 2019-06-01 RX ADMIN — FOLIC ACID 1 MG: 1 TABLET ORAL at 08:06

## 2019-06-01 RX ADMIN — NICOTINE 1 PATCH: 14 PATCH, EXTENDED RELEASE TRANSDERMAL at 09:06

## 2019-06-01 NOTE — PLAN OF CARE
Problem: Adult Behavioral Health Plan of Care  Goal: Plan of Care Review  Outcome: Ongoing (interventions implemented as appropriate)  patietn depressed,  Talking to others in dayroom some of the time. complaining of right foot pain. Vs stable and eating well. Denies any SI/HI will continue to monitor patient.

## 2019-06-01 NOTE — PROGRESS NOTES
"PSYCHIATRY DAILY INPATIENT PROGRESS NOTE  SUBSEQUENT HOSPITAL VISIT    ENCOUNTER DATE: 6/1/2019  SITE: [facility]    DATE OF ADMISSION: 5/29/2019  6:40 PM  LENGTH OF STAY: 3 days      HISTORY    CHIEF COMPLAINT   Chon Bess is a 44 y.o. male, seen during daily snyder rounds on the inpatient unit.  Chon Bess presents with the chief complaint of suicidal ideation    HPI   (Elements: Location, Quality, Severity, Duration, Timing, Content, Modifying Factors, Associated Signs & Symptoms)    The patient was seen and examined. The chart was reviewed.     Staff reports no behavioral or management issues.     The patient has been compliant with treatment. The patient denied any side effects.    Patient continues to endorse suicidal ideation but says it is "lessening".  He is trying to organize a proper disposition for himself but circumstance is driving his suicidal ideation and depression.      Continued but improving Symptoms of Depression: less diminished mood or loss of interest/anhedonia; +irritability, +diminished energy, +change in sleep, +change in appetite, +diminished concentration or cognition or indecisiveness, +PMA/R, +excessive guilt or hopelessness or worthlessness, +suicidal ideations     +Changes in Sleep: no trouble with initiation/maintenance, no early morning awakening with inability to return to sleep; +hypersomnolence     +Suicidal/Homicidal ideations:active/+passive ideations, no organized plans, no future intentions     Denied past or current Symptoms of psychosis: no hallucinations, delusions, disorganized thinking, disorganized behavior or abnormal motor behavior, or negative symptoms     Denied past or current Symptoms of salud or hypomania: no elevated, expansive, or irritable mood with no increased energy or activity; with no inflated self-esteem or grandiosity, decreased need for sleep, increased rate of speech, FOI or racing thoughts, distractibility, increased goal " "directed activity or PMA, or risky/disinhibited behavior     Some Symptoms of FRANCK: +excessive anxiety/worry/fear, +more days than not, +about numerous issues, +difficult to control, with periodic symptoms of restlessness, fatigue, poor concentration, irritability, muscle tension, and sleep disturbance; +causes functionally impairing distress; symptoms are adjustment related      +Substance Use: positive for intoxication, withdrawal, tolerance, used in larger amounts or duration than intended, unsuccessful attempts to limit or quit, increased time engaging in or seeking out, cravings or strong desire to use, failure to fulfill obligations, negative consequences in social/interpersonal/occupational,/recreational areas, use in dangerous situations, and medical or psychological consequences; pt denied having a problem, but described the above pathology as a consequence of his usage      ROS  Review of systems  Constitutional: No recent change in weight or fever  Musculoskeletal: +foot pain  Respiratory: No cough or shortness of breath  Cardiovascular: No chest pain, palpitations, or leg swelling  Gastrointestinal: No abdominal pain, nausea vomiting, or diarrhea  Genitourinary: No pain on urination  Neurologic: No dizziness, seizures, or headaches  Eyes: No change in vision  HENT: No congestion, hearing problem, tinnitus, dysphagia, or sore throat  Skin: No rash or wound    PAST MEDICAL HISTORY   Past Medical History:   Diagnosis Date    Addiction to drug 1999    Patient stated he first developed his addiction problem.     ADHD (attention deficit hyperactivity disorder) 1985    Patient was diagnosed with ADHD    Adjustment disorder 2000    Patient first got his divorce he stated.     Anxiety 2010    Patient stated he was first put "on xanax."     Crohn's disease     Depression 2015    Patient broke his foot and "could not do anything any more" he stated.     Sleep difficulties 2019    This year patient has been " "sleeping fourteen to sixteen hours a day.     Substance abuse            PSYCHOTROPIC MEDICATIONS   Scheduled Meds:   buPROPion  150 mg Oral Daily    folic acid  1 mg Oral Daily    multivitamin  1 tablet Oral Daily    nicotine  1 patch Transdermal Daily     Continuous Infusions:  PRN Meds:.acetaminophen, aluminum-magnesium hydroxide-simethicone, docusate sodium, hydrOXYzine pamoate, loperamide, OLANZapine **AND** OLANZapine        EXAMINATION    VITALS   Vitals:    06/01/19 0803   BP: 128/72   Pulse: 79   Resp: 18   Temp: 97.2 °F (36.2 °C)       CONSTITUTIONAL  General Appearance: WM, in hospital garb, normal weight; NAD     MUSCULOSKELETAL  Muscle Strength and Tone:  normal  Abnormal Involuntary Movements:  none  Gait and Station:  normal; non-ataxic     PSYCHIATRIC   Level of Consciousness: awake, alert  Orientation: p/p/t/s  Grooming:  adequate to circumstances  Psychomotor Behavior: no PMA/R  Speech: nl r/t/v/s  Language:  English fluent  Mood: "alright tired"  Affect: irritable, decreased range, anxious, dysthymic  Thought Process:  linear and organized  Associations:  intact; no WILMER  Thought Content:  +SI denied AVH/delusions; denied HI  Memory:  intact to recent and remote events  Attention:  intact to conversation; not distractible   Fund of Knowledge:  age and education appropriate  Estimate if Intelligence:  average based on work/education history, vocabulary and mental status exam  Insight:  good- seeks help, understands/accepts illness  Judgment:   good- no bx issues, compliant and cooperative      DIAGNOSTIC TESTING   Laboratory Results  No results found for this or any previous visit (from the past 24 hour(s)).      MEDICAL DECISION MAKING    DIAGNOSES  Unspecified Depressive Disorder  Adjustment Disorder with anxious features     Nicotine Dependence  Amphetamine Use disorder, severe, continuous with physiological dependence      Amphetamine Withdrawal     Psychosocial stressors     Chron's " disease  Chronic foot pain    PROBLEM LIST AND MANAGEMENT PLANS        PRESCRIPTION DRUG MANAGEMENT  Compliance: yes  Side Effects: no  Regimen Adjustments:   Depression/Anxiety: Start trial of Wellbutrin  mg po q day     Nicotine use: pt counseled; Wellbutrin as above; nicotine patch daily     Substance use: pt counseled; encouraging rehab vs outpatient tx     Psychosocial stressors: Pt counseled; SW consulted to assist with resources     Chron's disease: pt counseled; FM consult reviewed     Chronic foot pain: pt counseled; FM consult reviewed - possibly use home footwear      DISCHARGE PLANNING  -SW to assist with aftercare planning and collateral  -Once stable discharge home with outpatient follow up care and/or rehab  -Continue inpatient treatment under a PEC and/or CEC for danger to self,  danger to others, and grave disability as evident by significant mood symptoms with SI and severe psychosocial stressors and substance use problems.     NEED FOR CONTINUED HOSPITALIZATION  Psychiatric illness continues to pose a potential threat to life or bodily function, of self or others, thereby requiring the need for continued inpatient psychiatric hospitalization: Yes    Protective inpatient pyschiatric hospitalization required while a safe disposition plan is enacted: Yes    Patient stabilized and ready for discharge from inpatient psychiatric unit: No      STAFF:   Pierre Mejia MD  Psychiatry

## 2019-06-01 NOTE — PLAN OF CARE
Problem: Adult Behavioral Health Plan of Care  Goal: Plan of Care Review  Outcome: Ongoing (interventions implemented as appropriate)  Pt out on unit.Pt reports poor sleep last night.Appetite good.Hygiene and grooming poor.Pt reports he feels a little better today.Medication compliant.

## 2019-06-02 PROCEDURE — 99233 PR SUBSEQUENT HOSPITAL CARE,LEVL III: ICD-10-PCS | Mod: ,,, | Performed by: PSYCHIATRY & NEUROLOGY

## 2019-06-02 PROCEDURE — 25000003 PHARM REV CODE 250: Performed by: PSYCHIATRY & NEUROLOGY

## 2019-06-02 PROCEDURE — S4991 NICOTINE PATCH NONLEGEND: HCPCS | Performed by: PSYCHIATRY & NEUROLOGY

## 2019-06-02 PROCEDURE — 11400000 HC PSYCH PRIVATE ROOM

## 2019-06-02 PROCEDURE — 99233 SBSQ HOSP IP/OBS HIGH 50: CPT | Mod: ,,, | Performed by: PSYCHIATRY & NEUROLOGY

## 2019-06-02 RX ORDER — BUPROPION HYDROCHLORIDE 300 MG/1
300 TABLET ORAL DAILY
Status: DISCONTINUED | OUTPATIENT
Start: 2019-06-02 | End: 2019-06-04 | Stop reason: HOSPADM

## 2019-06-02 RX ADMIN — THERA TABS 1 TABLET: TAB at 08:06

## 2019-06-02 RX ADMIN — BUPROPION HYDROCHLORIDE 300 MG: 300 TABLET, FILM COATED, EXTENDED RELEASE ORAL at 08:06

## 2019-06-02 RX ADMIN — NICOTINE 1 PATCH: 14 PATCH, EXTENDED RELEASE TRANSDERMAL at 09:06

## 2019-06-02 RX ADMIN — FOLIC ACID 1 MG: 1 TABLET ORAL at 08:06

## 2019-06-02 NOTE — PLAN OF CARE
Problem: Adult Behavioral Health Plan of Care  Goal: Plan of Care Review  Outcome: Ongoing (interventions implemented as appropriate)  Pt has slept 8.5 hours with one interruption so far.  NAD.  Resp even & unlabored.  Pathways clear and bed is low.  Q 15 minute safety checks ongoing.  All precautions maintained.

## 2019-06-02 NOTE — PROGRESS NOTES
PSYCHIATRY DAILY INPATIENT PROGRESS NOTE  SUBSEQUENT HOSPITAL VISIT    ENCOUNTER DATE: 6/2/2019  SITE[facility]    DATE OF ADMISSION: 5/29/2019  6:40 PM  LENGTH OF STAY: 4 days      HISTORY    CHIEF COMPLAINT   Chon Bess is a 44 y.o. male, seen during daily snyder rounds on the inpatient unit.  Chon Bess presents with the chief complaint of suicidal ideation    HPI   (Elements: Location, Quality, Severity, Duration, Timing, Content, Modifying Factors, Associated Signs & Symptoms)    The patient was seen and examined. The chart was reviewed.     Staff reports no behavioral or management issues.     The patient has been compliant with treatment. The patient denied any side effects.    Patient has had suicidal ideation in the past 24 hours but none this morning.  He is focused on discharge planning.  He is tolerating medication.      Continued but improving Symptoms of Depression: less diminished mood or loss of interest/anhedonia; +irritability, +diminished energy, +change in sleep, +change in appetite, +diminished concentration or cognition or indecisiveness, +PMA/R, +excessive guilt or hopelessness or worthlessness, less suicidal ideations     +Changes in Sleep: no trouble with initiation/maintenance, no early morning awakening with inability to return to sleep; +hypersomnolence     +Suicidal/Homicidal ideations:active/+passive ideations, no organized plans, no future intentions     Denied past or current Symptoms of psychosis: no hallucinations, delusions, disorganized thinking, disorganized behavior or abnormal motor behavior, or negative symptoms     Denied past or current Symptoms of salud or hypomania: no elevated, expansive, or irritable mood with no increased energy or activity; with no inflated self-esteem or grandiosity, decreased need for sleep, increased rate of speech, FOI or racing thoughts, distractibility, increased goal directed activity or PMA, or risky/disinhibited  "behavior     Some Symptoms of FRANCK: +excessive anxiety/worry/fear, +more days than not, +about numerous issues, +difficult to control, with periodic symptoms of restlessness, fatigue, poor concentration, irritability, muscle tension, and sleep disturbance; +causes functionally impairing distress; symptoms are adjustment related      +Substance Use: positive for intoxication, withdrawal, tolerance, used in larger amounts or duration than intended, unsuccessful attempts to limit or quit, increased time engaging in or seeking out, cravings or strong desire to use, failure to fulfill obligations, negative consequences in social/interpersonal/occupational,/recreational areas, use in dangerous situations, and medical or psychological consequences; pt denied having a problem, but described the above pathology as a consequence of his usage      ROS  Review of systems  Constitutional: No recent change in weight or fever  Musculoskeletal: +foot pain  Respiratory: No cough or shortness of breath  Cardiovascular: No chest pain, palpitations, or leg swelling  Gastrointestinal: No abdominal pain, nausea vomiting, or diarrhea  Genitourinary: No pain on urination  Neurologic: No dizziness, seizures, or headaches  Eyes: No change in vision  HENT: No congestion, hearing problem, tinnitus, dysphagia, or sore throat  Skin: No rash or wound    PAST MEDICAL HISTORY   Past Medical History:   Diagnosis Date    Addiction to drug 1999    Patient stated he first developed his addiction problem.     ADHD (attention deficit hyperactivity disorder) 1985    Patient was diagnosed with ADHD    Adjustment disorder 2000    Patient first got his divorce he stated.     Anxiety 2010    Patient stated he was first put "on xanax."     Crohn's disease     Depression 2015    Patient broke his foot and "could not do anything any more" he stated.     Sleep difficulties 2019    This year patient has been sleeping fourteen to sixteen hours a day.     " "Substance abuse            PSYCHOTROPIC MEDICATIONS   Scheduled Meds:   buPROPion  150 mg Oral Daily    folic acid  1 mg Oral Daily    multivitamin  1 tablet Oral Daily    nicotine  1 patch Transdermal Daily     Continuous Infusions:  PRN Meds:.acetaminophen, aluminum-magnesium hydroxide-simethicone, docusate sodium, hydrOXYzine pamoate, loperamide, OLANZapine **AND** OLANZapine        EXAMINATION    VITALS   Vitals:    06/02/19 0757   BP: 101/64   Pulse: 77   Resp: 18   Temp: 96.8 °F (36 °C)       CONSTITUTIONAL  General Appearance: WM, in hospital garb, normal weight; NAD     MUSCULOSKELETAL  Muscle Strength and Tone:  normal  Abnormal Involuntary Movements:  none  Gait and Station:  normal; non-ataxic     PSYCHIATRIC   Level of Consciousness: awake, alert  Orientation: p/p/t/s  Grooming:  adequate to circumstances  Psychomotor Behavior: no PMA/R  Speech: nl r/t/v/s  Language:  English fluent  Mood: "fine I guess"  Affect: decreased range, dysphoric  Thought Process:  linear and organized  Associations:  intact; no WILMER  Thought Content:  +SI denied AVH/delusions; denied HI  Memory:  intact to recent and remote events  Attention:  intact to conversation; not distractible   Fund of Knowledge:  age and education appropriate  Estimate if Intelligence:  average based on work/education history, vocabulary and mental status exam  Insight:  good- seeks help, understands/accepts illness  Judgment:   good- no bx issues, compliant and cooperative      DIAGNOSTIC TESTING   Laboratory Results  No results found for this or any previous visit (from the past 24 hour(s)).      MEDICAL DECISION MAKING    DIAGNOSES  Unspecified Depressive Disorder  Adjustment Disorder with anxious features     Nicotine Dependence  Amphetamine Use disorder, severe, continuous with physiological dependence      Amphetamine Withdrawal     Psychosocial stressors     Chron's disease  Chronic foot pain    PROBLEM LIST AND MANAGEMENT " PLANS        PRESCRIPTION DRUG MANAGEMENT  Compliance: yes  Side Effects: no  Regimen Adjustments:   Depression/Anxiety: Increase Wellbutrin  mg po q day     Nicotine use: pt counseled; Wellbutrin as above; nicotine patch daily     Substance use: pt counseled; encouraging rehab vs outpatient tx     Psychosocial stressors: Pt counseled; SW consulted to assist with resources     Chron's disease: pt counseled; FM consult reviewed     Chronic foot pain: pt counseled; FM consult reviewed - possibly use home footwear      DISCHARGE PLANNING  -SW to assist with aftercare planning and collateral  -Once stable discharge home with outpatient follow up care and/or rehab  -Continue inpatient treatment under a PEC and/or CEC for danger to self,  danger to others, and grave disability as evident by significant mood symptoms with SI and severe psychosocial stressors and substance use problems.     NEED FOR CONTINUED HOSPITALIZATION  Psychiatric illness continues to pose a potential threat to life or bodily function, of self or others, thereby requiring the need for continued inpatient psychiatric hospitalization: Yes    Protective inpatient pyschiatric hospitalization required while a safe disposition plan is enacted: Yes    Patient stabilized and ready for discharge from inpatient psychiatric unit: No      STAFF:   Pierre Mejia MD  Psychiatry

## 2019-06-02 NOTE — PLAN OF CARE
Problem: Adult Behavioral Health Plan of Care  Goal: Plan of Care Review  Outcome: Ongoing (interventions implemented as appropriate)  Up and about the unit for a few hours this evening.  Calm, quiet, and cooperative.  No angry outbursts. No complaints.  Ate PM snack and spent some time in the dayroom watching TV.  Encouraged to stay out of assigned room and participate in unit activities. All precautions maintained,

## 2019-06-02 NOTE — PLAN OF CARE
Problem: Adult Behavioral Health Plan of Care  Goal: Plan of Care Review  Outcome: Ongoing (interventions implemented as appropriate)  Pt in bed much of day.Pt reports poor sleep again last night.Appetite good.Grooming poor.Pt worried about where he will stay once discharged.Pt reports he can't stay with his fiancee or family.He has no vehicle.Pt can't get a job with no place to stay.Pt has numerous work skills.Mood depressed.Medication compliant.

## 2019-06-03 PROCEDURE — 25000003 PHARM REV CODE 250: Performed by: PSYCHIATRY & NEUROLOGY

## 2019-06-03 PROCEDURE — 11400000 HC PSYCH PRIVATE ROOM

## 2019-06-03 PROCEDURE — 63600175 PHARM REV CODE 636 W HCPCS: Performed by: PSYCHIATRY & NEUROLOGY

## 2019-06-03 PROCEDURE — S4991 NICOTINE PATCH NONLEGEND: HCPCS | Performed by: PSYCHIATRY & NEUROLOGY

## 2019-06-03 PROCEDURE — 99233 PR SUBSEQUENT HOSPITAL CARE,LEVL III: ICD-10-PCS | Mod: ,,, | Performed by: PSYCHIATRY & NEUROLOGY

## 2019-06-03 PROCEDURE — 99233 SBSQ HOSP IP/OBS HIGH 50: CPT | Mod: ,,, | Performed by: PSYCHIATRY & NEUROLOGY

## 2019-06-03 RX ORDER — PREDNISONE 10 MG/1
10 TABLET ORAL DAILY
Status: DISCONTINUED | OUTPATIENT
Start: 2019-06-03 | End: 2019-06-04 | Stop reason: HOSPADM

## 2019-06-03 RX ORDER — NICOTINE 7MG/24HR
1 PATCH, TRANSDERMAL 24 HOURS TRANSDERMAL DAILY
Status: DISCONTINUED | OUTPATIENT
Start: 2019-06-03 | End: 2019-06-04 | Stop reason: HOSPADM

## 2019-06-03 RX ADMIN — ACETAMINOPHEN 650 MG: 325 TABLET ORAL at 11:06

## 2019-06-03 RX ADMIN — NICOTINE 1 PATCH: 7 PATCH, EXTENDED RELEASE TRANSDERMAL at 08:06

## 2019-06-03 RX ADMIN — THERA TABS 1 TABLET: TAB at 08:06

## 2019-06-03 RX ADMIN — BUPROPION HYDROCHLORIDE 300 MG: 300 TABLET, FILM COATED, EXTENDED RELEASE ORAL at 08:06

## 2019-06-03 RX ADMIN — PREDNISONE 10 MG: 10 TABLET ORAL at 11:06

## 2019-06-03 RX ADMIN — FOLIC ACID 1 MG: 1 TABLET ORAL at 08:06

## 2019-06-03 NOTE — PLAN OF CARE
Problem: Adult Behavioral Health Plan of Care  Goal: Plan of Care Review  Outcome: Ongoing (interventions implemented as appropriate)  Visible in the milieu.  In and out of dayroom.  Tried using the phone multiple times but unable to reach whoever he was trying to call.  Pt focused on where he will go once discharged from [facility].  Pt is homeless and jobless.  Pt is restless and anxious but cooperative.  Poor interaction with peers.  Minimal interaction with staff.  Accepting meals and snacks.  All precautions maintained.

## 2019-06-03 NOTE — PROGRESS NOTES
PSYCHIATRY DAILY INPATIENT PROGRESS NOTE  SUBSEQUENT HOSPITAL VISIT    ENCOUNTER DATE: 6/3/2019  [facility]    DATE OF ADMISSION: 5/29/2019  6:40 PM  LENGTH OF STAY: 5 days      HISTORY    CHIEF COMPLAINT   Chon Bess is a 44 y.o. male, seen during daily snyder rounds on the inpatient unit.  Chon Bess presents with the chief complaint of suicidal ideation    HPI   (Elements: Location, Quality, Severity, Duration, Timing, Content, Modifying Factors, Associated Signs & Symptoms)    The patient was seen and examined. The chart was reviewed.     Staff reports no behavioral or management issues.     The patient has been compliant with treatment. The patient denied any side effects.    Reviewed notes by LPN, RNs and MD from the last 24 hours.     His case was discussed with his staff/treatment team, including RN, LPC and CTRS.     Patient has had no suicidal ideation in the past 24 hours.  He is focused on discharge planning and is more future oriented.  He is tolerating medication adjustments well. Psychosocial stressors, especially homelessness, remain the primary  of his symptoms.      Improving Symptoms of Depression: less diminished mood or loss of interest/anhedonia; less irritability, less diminished energy, less change in sleep, less change in appetite, less diminished concentration or cognition or indecisiveness, less PMA/R, less excessive guilt or hopelessness or worthlessness, no suicidal ideations     Improving Changes in Sleep: no trouble with initiation/maintenance, no early morning awakening with inability to return to sleep; less hypersomnolence     Improved Suicidal/Homicidal ideations: no active/passive ideations, no organized plans, no future intentions     Denied past or current Symptoms of psychosis: no hallucinations, delusions, disorganized thinking, disorganized behavior or abnormal motor behavior, or negative symptoms     Denied past or current Symptoms of salud  "or hypomania: no elevated, expansive, or irritable mood with no increased energy or activity; with no inflated self-esteem or grandiosity, decreased need for sleep, increased rate of speech, FOI or racing thoughts, distractibility, increased goal directed activity or PMA, or risky/disinhibited behavior     Improving  Symptoms of FRANCK: less excessive anxiety/worry/fear, with less symptoms of restlessness, fatigue, poor concentration, irritability, muscle tension, and sleep disturbance    +Substance Use: positive for intoxication, withdrawal, tolerance, used in larger amounts or duration than intended, unsuccessful attempts to limit or quit, increased time engaging in or seeking out, cravings or strong desire to use, failure to fulfill obligations, negative consequences in social/interpersonal/occupational,/recreational areas, use in dangerous situations, and medical or psychological consequences; pt denied having a problem, but described the above pathology as a consequence of his usage  -he maintains that he "doesn't need" any drugs and can stop when he wants; he does not want inpatient rehab or outpatient treatment.     ROS  Review of systems  Constitutional: No recent change in weight or fever  Musculoskeletal: +foot pain  Respiratory: No cough or shortness of breath  Cardiovascular: No chest pain, palpitations, or leg swelling  Gastrointestinal: No abdominal pain, nausea vomiting, or diarrhea  Genitourinary: No pain on urination  Neurologic: No dizziness, seizures, or headaches  Eyes: No change in vision  HENT: No congestion, hearing problem, tinnitus, dysphagia, or sore throat  Skin: No rash or wound    PAST MEDICAL HISTORY   Past Medical History:   Diagnosis Date    Addiction to drug 1999    Patient stated he first developed his addiction problem.     ADHD (attention deficit hyperactivity disorder) 1985    Patient was diagnosed with ADHD    Adjustment disorder 2000    Patient first got his divorce he stated.  " "   Anxiety 2010    Patient stated he was first put "on xanax."     Crohn's disease     Depression 2015    Patient broke his foot and "could not do anything any more" he stated.     Sleep difficulties 2019    This year patient has been sleeping fourteen to sixteen hours a day.     Substance abuse            PSYCHOTROPIC MEDICATIONS   Scheduled Meds:   buPROPion  300 mg Oral Daily    folic acid  1 mg Oral Daily    multivitamin  1 tablet Oral Daily    nicotine  1 patch Transdermal Daily     Continuous Infusions:  PRN Meds:.acetaminophen, aluminum-magnesium hydroxide-simethicone, docusate sodium, hydrOXYzine pamoate, loperamide, OLANZapine **AND** OLANZapine        EXAMINATION    VITALS   Vitals:    06/02/19 2100   BP: 111/78   Pulse: 86   Resp: 18   Temp: 97.8 °F (36.6 °C)     Body mass index is 22.35 kg/m².      CONSTITUTIONAL  General Appearance: WM, in hospital garb, normal weight; NAD     MUSCULOSKELETAL  Muscle Strength and Tone:  normal  Abnormal Involuntary Movements:  none  Gait and Station:  normal; non-ataxic     PSYCHIATRIC   Level of Consciousness: awake, alert  Orientation: p/p/t/s  Grooming:  adequate to circumstances  Psychomotor Behavior: no PMA/R  Speech: nl r/t/v/s  Language:  English fluent  Mood: "a little better"  Affect: less decreased range, less dysphoric; improving  Thought Process:  linear and organized  Associations:  intact; no WILMER  Thought Content:  no SI, denied AVH/delusions; denied HI  Memory:  intact to recent and remote events  Attention:  intact to conversation; not distractible   Fund of Knowledge:  age and education appropriate  Estimate if Intelligence:  average based on work/education history, vocabulary and mental status exam  Insight:  good- seeks help, understands/accepts illness  Judgment:   good- no bx issues, compliant and cooperative      DIAGNOSTIC TESTING   Laboratory Results  No results found for this or any previous visit (from the past 24 " hour(s)).      MEDICAL DECISION MAKING    DIAGNOSES  Unspecified Depressive Disorder  Adjustment Disorder with anxious features     Nicotine Dependence  Amphetamine Use disorder, severe, continuous with physiological dependence      Amphetamine Withdrawal     Psychosocial stressors     Chron's disease  Chronic foot pain    PROBLEM LIST AND MANAGEMENT PLANS        PRESCRIPTION DRUG MANAGEMENT  Compliance: yes  Side Effects: no  Regimen Adjustments:     Depression/Anxiety: Increased Wellbutrin  mg po q day     Nicotine use: pt counseled; Wellbutrin as above; nicotine patch daily; pt does not want to quit; decrease patch from 14 to 7 mg dermal q day     Substance use: pt counseled;pt declined rehab and outpatient tx; he feels that he does not have a substance use problem and does not need help     Psychosocial stressors: Pt counseled; SW consulted and assisting with resources     Chron's disease: pt counseled; FM consult reviewed; no medications indicated at this time; f/u with PCP as scheudled     Chronic foot pain: pt counseled; FM consult reviewed; no scheduled medications indicated at this time; f/u with PCP as scheduled; tylenol prn      DISCHARGE PLANNING  -SW to assist with aftercare planning and collateral  -Once stable discharge home with outpatient follow up care and/or rehab  -Continue inpatient treatment under a PEC and/or CEC for danger to self,  danger to others, and grave disability as evident by significant mood symptoms with SI and severe psychosocial stressors and substance use problems. Pt is improving and should be stable for discharge and transition to outpatient care in the next 1-2 days    NEED FOR CONTINUED HOSPITALIZATION  Psychiatric illness continues to pose a potential threat to life or bodily function, of self or others, thereby requiring the need for continued inpatient psychiatric hospitalization: Yes    Protective inpatient pyschiatric hospitalization required while a safe  disposition plan is enacted: Yes    Patient stabilized and ready for discharge from inpatient psychiatric unit: No      STAFF:   César Scott MD  Psychiatry

## 2019-06-03 NOTE — PLAN OF CARE
Problem: Adult Behavioral Health Plan of Care  Goal: Plan of Care Review  Outcome: Ongoing (interventions implemented as appropriate)  Pt still exhibits a depressed mood and affect but reports it's starting to get better.  Denies any S/I or H/I at this time.  Pt participating in groups and somewhat interactive on unit.  Complaining of stomach pain r/t crohn's 7/10 on pain scale.  Pt given some tylenol for discomfort and started on prednisone for inflammation.  Encouraged pt to continue following treatment plan and report any issues or concerns with staff.  Pt verbalized understanding.  No acute distress apparent at this time, will continue to monitor.

## 2019-06-03 NOTE — PLAN OF CARE
"Problem: Adult Behavioral Health Plan of Care  Goal: Optimized Coping Skills in Response to Life Stressors    Intervention: Promote Effective Coping Strategies  Group Note            Behavior    Patient attended group psychotherapy today. Patient exhibited depressive mood with appropriate affect. Patient needed significant re-direction today.         Intervention     CBT-based group psychotherapy focused today on internal coping strategies including what it means to choose devan and the implications for managing the crisis that may have led to the patient's hospitalization.       Response    Patient stated that he he still feels after the session that "the world is cruel." Patient stated that "things have changed so bad that choosing to live is a hard." Patient participated in an initial calming exercise and acknowledged the need at this time to set aside his anger with the world and to try to come with with identifying what is the problem which led to his hospitalization and how he realistic plans to address it. Patient acknowledged that goals need to be realistic and achievable; he also acknowledged that reaching the point of having an attitude of gratitude is very important in helping him calmly come up with a solution to his problems.         Plan    Patient will be encouraged to continue to attend group psychotherapy with focused attention on discharge planning and continued to work in the area of internal coping strategies.                   "

## 2019-06-03 NOTE — PROGRESS NOTES
"   06/03/19 1400   [facility] Group Therapy   Group Name Leisure Skills Training   Specific Interventions Cognitive Stimulation Training   Participation Level Supportive;Sharing   Participation Quality Cooperative;Social   Insight/Motivation Applies New Skills;Good   Affect/Mood Display Depressed   Cognition Alert   Psychomotor WNL   Patient shows interest ion activity and remembers directions, displayed good team effort, brainstorm with peers to answer questions. Patient states he had to use "thinking and teamwork" for this activity.  "

## 2019-06-03 NOTE — PLAN OF CARE
Problem: Adult Behavioral Health Plan of Care  Goal: Plan of Care Review  Outcome: Ongoing (interventions implemented as appropriate)  Pt has slept 7.5 hours with one interruption so far.  NAD.  Resp even & unlabored.  Pathways clear and bed is low.  Q 15 minute safety checks ongoing.  All precautions maintained.

## 2019-06-04 VITALS
TEMPERATURE: 97 F | RESPIRATION RATE: 18 BRPM | DIASTOLIC BLOOD PRESSURE: 67 MMHG | SYSTOLIC BLOOD PRESSURE: 95 MMHG | HEIGHT: 71 IN | HEART RATE: 95 BPM | BODY MASS INDEX: 22.44 KG/M2 | WEIGHT: 160.25 LBS

## 2019-06-04 PROBLEM — F33.2 SEVERE EPISODE OF RECURRENT MAJOR DEPRESSIVE DISORDER, WITHOUT PSYCHOTIC FEATURES: Status: ACTIVE | Noted: 2019-05-29

## 2019-06-04 PROCEDURE — S4991 NICOTINE PATCH NONLEGEND: HCPCS | Performed by: PSYCHIATRY & NEUROLOGY

## 2019-06-04 PROCEDURE — 25000003 PHARM REV CODE 250: Performed by: PSYCHIATRY & NEUROLOGY

## 2019-06-04 PROCEDURE — 99238 PR HOSPITAL DISCHARGE DAY,<30 MIN: ICD-10-PCS | Mod: ,,, | Performed by: PSYCHIATRY & NEUROLOGY

## 2019-06-04 PROCEDURE — 99238 HOSP IP/OBS DSCHRG MGMT 30/<: CPT | Mod: ,,, | Performed by: PSYCHIATRY & NEUROLOGY

## 2019-06-04 PROCEDURE — 63600175 PHARM REV CODE 636 W HCPCS: Performed by: PSYCHIATRY & NEUROLOGY

## 2019-06-04 RX ORDER — BUSPIRONE HYDROCHLORIDE 5 MG/1
5 TABLET ORAL 3 TIMES DAILY
Qty: 90 TABLET | Refills: 1 | Status: SHIPPED | OUTPATIENT
Start: 2019-06-04 | End: 2020-02-19 | Stop reason: SDUPTHER

## 2019-06-04 RX ORDER — PREDNISONE 10 MG/1
10 TABLET ORAL DAILY
Qty: 7 TABLET | Refills: 0 | Status: SHIPPED | OUTPATIENT
Start: 2019-06-05 | End: 2019-06-12

## 2019-06-04 RX ORDER — BUPROPION HYDROCHLORIDE 300 MG/1
300 TABLET ORAL DAILY
Qty: 30 TABLET | Refills: 1 | Status: SHIPPED | OUTPATIENT
Start: 2019-06-05 | End: 2019-08-04

## 2019-06-04 RX ORDER — IBUPROFEN 200 MG
1 TABLET ORAL DAILY
COMMUNITY
Start: 2019-06-04

## 2019-06-04 RX ADMIN — FOLIC ACID 1 MG: 1 TABLET ORAL at 08:06

## 2019-06-04 RX ADMIN — NICOTINE 1 PATCH: 7 PATCH, EXTENDED RELEASE TRANSDERMAL at 08:06

## 2019-06-04 RX ADMIN — PREDNISONE 10 MG: 10 TABLET ORAL at 08:06

## 2019-06-04 RX ADMIN — THERA TABS 1 TABLET: TAB at 08:06

## 2019-06-04 RX ADMIN — BUPROPION HYDROCHLORIDE 300 MG: 300 TABLET, FILM COATED, EXTENDED RELEASE ORAL at 08:06

## 2019-06-04 NOTE — PSYCH
Patient will be following up with [facility] at [address] [place] [address] in [place], [phone].  Appointment is on 6/6/2019 at 8:30 am.  Patient will receive tobacco cessation therapy along with substance abuse therapy due to a positive UDS on admit.  AVS faxed on 6/4/2019 at 11:37 am.

## 2019-06-04 NOTE — NURSING
Pt discharge arranged for today.  Meeting with SW to arranged medicaid transportation.  All belongings accounted for and will be returned upon discharge.  Pt denies any S/I or H/I at this time.  Pt AAO times 4, speech and sensorium clear, VS WDL, ambulatory and in no acute distress at this time.  DC instructions and meds reviewed with pt, understanding verbalized.

## 2019-06-04 NOTE — PLAN OF CARE
Problem: Adult Behavioral Health Plan of Care  Goal: Plan of Care Review  Outcome: Ongoing (interventions implemented as appropriate)  Pt.  Slept 7  Hours  so far this shift without problems noted. q 15 min safety checks done this shift. Safety and comfort maintained. Cont. Plan.

## 2019-06-04 NOTE — PLAN OF CARE
"Problem: Adult Behavioral Health Plan of Care  Goal: Develops/Participates in Therapeutic [place] to Support Successful Transition    Intervention: Mutually Develop Transition Plan  .Final Discharge Disposition    Patient will be discharged today to [place].     Residential:    Patient stated he will use the Vendor List provided to him to find a more permanent place for him to live in the future.       Aftercare:    Patient was given a copy of his safety plan and was given an appointment at [facility] for June 6, 2019 for 8:30 am.     Transportation:    Medical Transportation through [employer]; confirmation number is [id number].     Additional Info:    Patient stated that he has gotten "some ideas" from the Vendor List. Patient will be staying for time time being he stated at [address] [facility] in [place]. Patient stated that he will consider declaring to the staff members at [facility] that he would like treatment for both his chemical dependency problem and his mental health problem.             "
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
## 2019-06-04 NOTE — NURSING
Received pt. In dayroom watching TV. Mood and affect noted to be constricted, blunted. Pt. Was asked of discharge plans and pt. Just shrugged shoulders and stated he hopes to go tomorrow but did not know where he was going. Denied self harm thoughts at this time. No problems noted at this time.
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
